# Patient Record
Sex: FEMALE | Race: ASIAN | NOT HISPANIC OR LATINO | URBAN - METROPOLITAN AREA
[De-identification: names, ages, dates, MRNs, and addresses within clinical notes are randomized per-mention and may not be internally consistent; named-entity substitution may affect disease eponyms.]

---

## 2017-09-02 ENCOUNTER — EMERGENCY (EMERGENCY)
Facility: HOSPITAL | Age: 57
LOS: 1 days | Discharge: HOME | End: 2017-09-02
Attending: EMERGENCY MEDICINE | Admitting: EMERGENCY MEDICINE
Payer: COMMERCIAL

## 2017-09-02 VITALS
DIASTOLIC BLOOD PRESSURE: 59 MMHG | SYSTOLIC BLOOD PRESSURE: 111 MMHG | TEMPERATURE: 98 F | OXYGEN SATURATION: 100 % | HEART RATE: 76 BPM | RESPIRATION RATE: 16 BRPM

## 2017-09-02 VITALS
DIASTOLIC BLOOD PRESSURE: 49 MMHG | HEART RATE: 65 BPM | OXYGEN SATURATION: 100 % | RESPIRATION RATE: 14 BRPM | SYSTOLIC BLOOD PRESSURE: 103 MMHG

## 2017-09-02 LAB
ALBUMIN SERPL ELPH-MCNC: 4 G/DL — SIGNIFICANT CHANGE UP (ref 3.3–5)
ALP SERPL-CCNC: 44 U/L — SIGNIFICANT CHANGE UP (ref 40–120)
ALT FLD-CCNC: 33 U/L — SIGNIFICANT CHANGE UP (ref 4–33)
APPEARANCE UR: SIGNIFICANT CHANGE UP
AST SERPL-CCNC: 29 U/L — SIGNIFICANT CHANGE UP (ref 4–32)
BACTERIA # UR AUTO: SIGNIFICANT CHANGE UP
BASOPHILS # BLD AUTO: 0.03 K/UL — SIGNIFICANT CHANGE UP (ref 0–0.2)
BASOPHILS NFR BLD AUTO: 0.4 % — SIGNIFICANT CHANGE UP (ref 0–2)
BILIRUB SERPL-MCNC: 0.2 MG/DL — SIGNIFICANT CHANGE UP (ref 0.2–1.2)
BILIRUB UR-MCNC: NEGATIVE — SIGNIFICANT CHANGE UP
BLOOD UR QL VISUAL: NEGATIVE — SIGNIFICANT CHANGE UP
BUN SERPL-MCNC: 16 MG/DL — SIGNIFICANT CHANGE UP (ref 7–23)
CALCIUM SERPL-MCNC: 9.2 MG/DL — SIGNIFICANT CHANGE UP (ref 8.4–10.5)
CHLORIDE SERPL-SCNC: 104 MMOL/L — SIGNIFICANT CHANGE UP (ref 98–107)
CO2 SERPL-SCNC: 23 MMOL/L — SIGNIFICANT CHANGE UP (ref 22–31)
COLOR SPEC: YELLOW — SIGNIFICANT CHANGE UP
CREAT SERPL-MCNC: 0.83 MG/DL — SIGNIFICANT CHANGE UP (ref 0.5–1.3)
EOSINOPHIL # BLD AUTO: 0.04 K/UL — SIGNIFICANT CHANGE UP (ref 0–0.5)
EOSINOPHIL NFR BLD AUTO: 0.6 % — SIGNIFICANT CHANGE UP (ref 0–6)
GLUCOSE SERPL-MCNC: 129 MG/DL — HIGH (ref 70–99)
GLUCOSE UR-MCNC: NEGATIVE — SIGNIFICANT CHANGE UP
HCT VFR BLD CALC: 37.1 % — SIGNIFICANT CHANGE UP (ref 34.5–45)
HGB BLD-MCNC: 11.9 G/DL — SIGNIFICANT CHANGE UP (ref 11.5–15.5)
HYALINE CASTS # UR AUTO: SIGNIFICANT CHANGE UP (ref 0–?)
IMM GRANULOCYTES # BLD AUTO: 0.02 # — SIGNIFICANT CHANGE UP
IMM GRANULOCYTES NFR BLD AUTO: 0.3 % — SIGNIFICANT CHANGE UP (ref 0–1.5)
KETONES UR-MCNC: NEGATIVE — SIGNIFICANT CHANGE UP
LEUKOCYTE ESTERASE UR-ACNC: HIGH
LYMPHOCYTES # BLD AUTO: 2.49 K/UL — SIGNIFICANT CHANGE UP (ref 1–3.3)
LYMPHOCYTES # BLD AUTO: 36.1 % — SIGNIFICANT CHANGE UP (ref 13–44)
MCHC RBC-ENTMCNC: 28.7 PG — SIGNIFICANT CHANGE UP (ref 27–34)
MCHC RBC-ENTMCNC: 32.1 % — SIGNIFICANT CHANGE UP (ref 32–36)
MCV RBC AUTO: 89.6 FL — SIGNIFICANT CHANGE UP (ref 80–100)
MONOCYTES # BLD AUTO: 0.42 K/UL — SIGNIFICANT CHANGE UP (ref 0–0.9)
MONOCYTES NFR BLD AUTO: 6.1 % — SIGNIFICANT CHANGE UP (ref 2–14)
MUCOUS THREADS # UR AUTO: SIGNIFICANT CHANGE UP
NEUTROPHILS # BLD AUTO: 3.89 K/UL — SIGNIFICANT CHANGE UP (ref 1.8–7.4)
NEUTROPHILS NFR BLD AUTO: 56.5 % — SIGNIFICANT CHANGE UP (ref 43–77)
NITRITE UR-MCNC: NEGATIVE — SIGNIFICANT CHANGE UP
NRBC # FLD: 0 — SIGNIFICANT CHANGE UP
PH UR: 6.5 — SIGNIFICANT CHANGE UP (ref 4.6–8)
PLATELET # BLD AUTO: 166 K/UL — SIGNIFICANT CHANGE UP (ref 150–400)
PMV BLD: 11.6 FL — SIGNIFICANT CHANGE UP (ref 7–13)
POTASSIUM SERPL-MCNC: 4.2 MMOL/L — SIGNIFICANT CHANGE UP (ref 3.5–5.3)
POTASSIUM SERPL-SCNC: 4.2 MMOL/L — SIGNIFICANT CHANGE UP (ref 3.5–5.3)
PROT SERPL-MCNC: 6.9 G/DL — SIGNIFICANT CHANGE UP (ref 6–8.3)
PROT UR-MCNC: 20 — SIGNIFICANT CHANGE UP
RBC # BLD: 4.14 M/UL — SIGNIFICANT CHANGE UP (ref 3.8–5.2)
RBC # FLD: 14.5 % — SIGNIFICANT CHANGE UP (ref 10.3–14.5)
RBC CASTS # UR COMP ASSIST: SIGNIFICANT CHANGE UP (ref 0–?)
SODIUM SERPL-SCNC: 141 MMOL/L — SIGNIFICANT CHANGE UP (ref 135–145)
SP GR SPEC: 1.02 — SIGNIFICANT CHANGE UP (ref 1–1.03)
SQUAMOUS # UR AUTO: SIGNIFICANT CHANGE UP
UROBILINOGEN FLD QL: NORMAL E.U. — SIGNIFICANT CHANGE UP (ref 0.1–0.2)
WBC # BLD: 6.89 K/UL — SIGNIFICANT CHANGE UP (ref 3.8–10.5)
WBC # FLD AUTO: 6.89 K/UL — SIGNIFICANT CHANGE UP (ref 3.8–10.5)
WBC UR QL: SIGNIFICANT CHANGE UP (ref 0–?)

## 2017-09-02 PROCEDURE — 99285 EMERGENCY DEPT VISIT HI MDM: CPT | Mod: 25

## 2017-09-02 PROCEDURE — 74177 CT ABD & PELVIS W/CONTRAST: CPT | Mod: 26

## 2017-09-02 RX ORDER — LISINOPRIL 2.5 MG/1
0 TABLET ORAL
Qty: 0 | Refills: 0 | COMMUNITY

## 2017-09-02 RX ORDER — METFORMIN HYDROCHLORIDE 850 MG/1
0 TABLET ORAL
Qty: 0 | Refills: 0 | COMMUNITY

## 2017-09-02 RX ORDER — FUROSEMIDE 40 MG
20 TABLET ORAL ONCE
Qty: 0 | Refills: 0 | Status: DISCONTINUED | OUTPATIENT
Start: 2017-09-02 | End: 2017-09-02

## 2017-09-02 RX ORDER — SODIUM CHLORIDE 9 MG/ML
1000 INJECTION INTRAMUSCULAR; INTRAVENOUS; SUBCUTANEOUS ONCE
Qty: 0 | Refills: 0 | Status: COMPLETED | OUTPATIENT
Start: 2017-09-02 | End: 2017-09-02

## 2017-09-02 RX ORDER — ACETAMINOPHEN 500 MG
1000 TABLET ORAL ONCE
Qty: 0 | Refills: 0 | Status: COMPLETED | OUTPATIENT
Start: 2017-09-02 | End: 2017-09-02

## 2017-09-02 RX ADMIN — SODIUM CHLORIDE 1000 MILLILITER(S): 9 INJECTION INTRAMUSCULAR; INTRAVENOUS; SUBCUTANEOUS at 06:42

## 2017-09-02 RX ADMIN — Medication 400 MILLIGRAM(S): at 06:42

## 2017-09-02 RX ADMIN — Medication 1000 MILLIGRAM(S): at 08:09

## 2017-09-02 NOTE — ED ADULT NURSE NOTE - CHIEF COMPLAINT QUOTE
Arrived to ED c/o Multiple medical complaints.  C/o pain L Hip radiating down L foot, Rt shoulder down to hand, paresthesias. Also reports Abd pain, denies nausea/vomiting/diarrhea.  States pain has been on/off x1 wk, went to PMD, exam was WNL and had EKG done offsite but unsure of the result. Pt denies any new trauma or injury, no changes in PO intake.  PMHx DM2 (metformin) & HTN (lisinopril), Anxiety (paroxetine) compliant w/ meds. .  Appearing in no distress.

## 2017-09-02 NOTE — ED PROVIDER NOTE - OBJECTIVE STATEMENT
57F with h/o DM2 (metformin), HTN (lisinopril), Anxiety (paroxetine for one month), and kidney stones comes in with one week of intermittent squeezing, dull RLQ abd pain which radiated to her back. She has never had this before. She also admitted to R arm pain which feels "hot and hard to close." Once she moved her arm around, the pain resolves. Also, she had been feeling some non-exertional chest discomfort and dyspenea on exertion for which she had recently seen her PMD and gotten a stress test (she doesn't know the results yet). She denies any fevers, chills, new SOB, dysuria, hematuria, GI complaints.

## 2017-09-02 NOTE — ED PROVIDER NOTE - PROGRESS NOTE DETAILS
Jonathan Weil, PGY1 - pain re-assessed. Very mild RLQ abd pain, much improved from earlier. R arm pain completely resolved. CT negative - patient feeling well for d/c home w/ outpt f/u. Return precautions discussed.

## 2017-09-02 NOTE — ED PROVIDER NOTE - ATTENDING CONTRIBUTION TO CARE
58 y/o F with h/o HTN, DM, hyperlipidemia, previous RAYRAY/BSO here with R arm pain and R lower abd pain.  Pt states that she woke up with a sharp pain radiating down from her right neck to aright arm.  Also with 3 days of intermittent RLQ abd pain.  No associated fever, chills, cough, cp, sob, n/v/c/d, dysuria.  Pt also c/o several weeks of L lower back pain occasionally radiaiting down her left leg.  She also reports having chest pain a couple weeks prior, was evaluated by her PMD for this pain earlier in the week and had an outpatient stress test, pedning results.  Currently pt is without any chest pain palp or sob.  Well appearing, lying comfortably in stretcher, awake and alert, nontoxic.  VSS.  No midline spinal tenderness.  Mild R paracervical and L lower paraspinal tenderness.  Lungs cta bl.  Cards nl S1/S2, RRR, no MRG.  Abd soft obese with RLQ tenderness, no rebound or guarding.  No pedal edema or calf tenderness.  Pos straight leg raise on left.  No focal neuro deficits, strength full and equal in bilat extremities upper and lower.  Sensation grossly normal throughout.  Plan for labs tylenol ct abd pel r/o appy.  If neg likely home with close PMD follow-up.

## 2017-09-02 NOTE — ED PROVIDER NOTE - MEDICAL DECISION MAKING DETAILS
57F with h/o HTN, DM, anxiety comes in with intermittent RLQ pains with radiation to back and flank. Possible ovarian torsion/detorsion, will get vaginal US. Possible nephrolithiasis, renal US.

## 2017-09-02 NOTE — ED ADULT NURSE REASSESSMENT NOTE - NS ED NURSE REASSESS COMMENT FT1
Pt received from night staff, Vicky, ambulatory. Pt appears to be resting comfortably in bed, no sign of acute distress was noted. Pt also states the pain in the RLQ is better after given IV tylenols. Denies any N & V or other discomfort at this time. VS are as noted. Will continue to monitor.

## 2017-09-02 NOTE — ED ADULT NURSE NOTE - OBJECTIVE STATEMENT
Patient a&o, NAD, arrived to ED room 1. Patient complains of ab pain worse in RLQ x 1 week without n/v/d. Also complains of chest pain, no change in characteristics, pending results from stress test Saturday. Also states R hand cramping pain, no hx of antritis. Patient was evaluated by ED provider. 20 G to right ac placed, labs/UA sent to lab.

## 2021-04-23 NOTE — ED ADULT TRIAGE NOTE - CHIEF COMPLAINT QUOTE
Arrived to ED c/o Multiple medical complaints.  C/o pain L Hip radiating down L foot, Rt shoulder down to hand, paresthesias. Also reports Abd pain, denies nausea/vomiting/diarrhea.  States pain has been on/off x1 wk, went to PMD, exam was WNL and had EKG done offsite but unsure of the result. Pt denies any new trauma or injury, no changes in PO intake.  PMHx DM2 (metformin) & HTN (lisinopril), Anxiety (paroxtine) compliant w/ meds.  Appearing in no distress. General Sunscreen Counseling: I recommended a broad spectrum sunscreen with a SPF of 30 or higher.  I explained that SPF 30 sunscreens block approximately 97 percent of the sun's harmful rays.  Sunscreens should be applied at least 15 minutes prior to expected sun exposure and then every 2 hours after that as long as sun exposure continues. If swimming or exercising sunscreen should be reapplied every 45 minutes to an hour after getting wet or sweating.  One ounce, or the equivalent of a shot glass full of sunscreen, is adequate to protect the skin not covered by a bathing suit. I also recommended a lip balm with a sunscreen as well. Sun protective clothing can be used in lieu of sunscreen but must be worn the entire time you are exposed to the sun's rays. Detail Level: Detailed Arrived to ED c/o Multiple medical complaints.  C/o pain L Hip radiating down L foot, Rt shoulder down to hand, paresthesias. Also reports Abd pain, denies nausea/vomiting/diarrhea.  States pain has been on/off x1 wk, went to PMD, exam was WNL and had EKG done offsite but unsure of the result. Pt denies any new trauma or injury, no changes in PO intake.  PMHx DM2 (metformin) & HTN (lisinopril), Anxiety (paroxetine) compliant w/ meds. .  Appearing in no distress.

## 2021-07-23 NOTE — ED PROVIDER NOTE - QUALITY
[No Acute Distress] : no acute distress [No Edema] : there was no peripheral edema [Normal] : soft, non-tender, non-distended, no masses palpated, no HSM and normal bowel sounds [No Focal Deficits] : no focal deficits [Alert and Oriented x3] : oriented to person, place, and time [de-identified] : tender R lateral rib area; no bruising; no crepitance noted deep pain/cramping

## 2024-07-28 NOTE — ED PROVIDER NOTE - RESPIRATORY, MLM
Pt ambulated to the treatment area with a slow steady gait. Pt states \" my right hip started hurting Wednesday I don't recall and injury I woke up and had pain. It radiates from my hip and buttock down my leg and hurts more with sitting.\"  
Breath sounds clear and equal bilaterally.